# Patient Record
Sex: MALE | Race: WHITE | URBAN - METROPOLITAN AREA
[De-identification: names, ages, dates, MRNs, and addresses within clinical notes are randomized per-mention and may not be internally consistent; named-entity substitution may affect disease eponyms.]

---

## 2017-01-30 ENCOUNTER — OFFICE VISIT (OUTPATIENT)
Dept: OPTOMETRY | Facility: CLINIC | Age: 47
End: 2017-01-30
Payer: COMMERCIAL

## 2017-01-30 DIAGNOSIS — H01.02A SQUAMOUS BLEPHARITIS OF UPPER AND LOWER EYELIDS OF BOTH EYES: ICD-10-CM

## 2017-01-30 DIAGNOSIS — B88.0 INFESTATION BY DEMODEX: Primary | ICD-10-CM

## 2017-01-30 DIAGNOSIS — H01.02B SQUAMOUS BLEPHARITIS OF UPPER AND LOWER EYELIDS OF BOTH EYES: ICD-10-CM

## 2017-01-30 DIAGNOSIS — H10.13 ALLERGIC CONJUNCTIVITIS, BILATERAL: ICD-10-CM

## 2017-01-30 PROCEDURE — 99212 OFFICE O/P EST SF 10 MIN: CPT | Performed by: OPTOMETRIST

## 2017-01-30 RX ORDER — NEOMYCIN POLYMYXIN B SULFATES AND DEXAMETHASONE 3.5; 10000; 1 MG/ML; [USP'U]/ML; MG/ML
1 SUSPENSION/ DROPS OPHTHALMIC 4 TIMES DAILY
Qty: 1 BOTTLE | Refills: 0 | Status: SHIPPED | OUTPATIENT
Start: 2017-01-30 | End: 2017-03-14

## 2017-01-30 RX ORDER — OLOPATADINE HYDROCHLORIDE 1 MG/ML
1 SOLUTION/ DROPS OPHTHALMIC 2 TIMES DAILY
Qty: 5 ML | Refills: 6 | Status: SHIPPED | OUTPATIENT
Start: 2017-01-30 | End: 2017-03-14

## 2017-01-30 ASSESSMENT — VISUAL ACUITY
OS_SC: 20/20
OD_SC+: -2
METHOD: SNELLEN - LINEAR
OD_SC: 20/20

## 2017-01-30 ASSESSMENT — SLIT LAMP EXAM - LIDS
COMMENTS: COLLARETTES, 1+ BLEPHARITIS
COMMENTS: COLLARETTES, 1+ BLEPHARITIS

## 2017-01-30 ASSESSMENT — TONOMETRY
IOP_METHOD: TONOPEN
OD_IOP_MMHG: 14
OS_IOP_MMHG: 14

## 2017-01-30 ASSESSMENT — EXTERNAL EXAM - RIGHT EYE: OD_EXAM: ROSACEA

## 2017-01-30 ASSESSMENT — EXTERNAL EXAM - LEFT EYE: OS_EXAM: ROSACEA

## 2017-01-30 NOTE — MR AVS SNAPSHOT
After Visit Summary   1/30/2017    Denis Starks    MRN: [id number]           Patient Information     Date Of Birth          1970        Visit Information        Provider Department      1/30/2017 2:20 PM Logan Manning, OD [facility]        Today's Diagnoses     Infestation by Demodex    -  1     Allergic conjunctivitis, bilateral         Squamous blepharitis of upper and lower eyelids of both eyes           Care Instructions    Maxitrol drops- 1 drop both eyes 4 x day for 1 week then 3 x day for 1 week, 2 x day for 1 week then switch to Systane Ultra or a lubricating eye drop so you are always using a drop 4 x day.    Tea tree oil lid scrub 2 x day for 8 weeks.    Return in 6-8 weeks for recheck or sooner if needed.  Be sure to keep doing lid scrubs and lubricating eye drops until seen.     Patanol- 1 drop both eyes 2 x day as needed for itchy eyes.    Logan Manning, CHECO    How to Use Tea Tree Oil for Eyelid Cleaning: always dilute it at least 50% with water or olive oil or coconut oil       1. With clean hands, put 1 drops of Tea Tree Oil in a cup and 2-3 drops of water (or olive oil or coconut oil)  2. CLOSE YOUR EYES  3. Use a cotton ball or washcloth to gently cleanse your closed eyelashes.  4. Leave on for about 1 minute.   5. Rinse with warm water.          Follow-ups after your visit        Who to contact     If you have questions or need follow up information about today's clinic visit or your schedule please contact [facility] directly at [phone].  Normal or non-critical lab and imaging results will be communicated to you by AIRSIShart, letter or phone within 4 business days after the clinic has received the results. If you do not hear from us within 7 days, please contact the clinic through AIRSIShart or phone. If you have a critical or abnormal lab result, we will notify you by phone as soon as possible.  Submit refill requests through [employer] or  "call your pharmacy and they will forward the refill request to us. Please allow 3 business days for your refill to be completed.          Additional Information About Your Visit        MyChart Information     [employer] lets you send messages to your doctor, view your test results, renew your prescriptions, schedule appointments and more. To sign up, go to [email or web address] . Click on \"Log in\" on the left side of the screen, which will take you to the Welcome page. Then click on \"Sign up Now\" on the right side of the page.     You will be asked to enter the access code listed below, as well as some personal information. Please follow the directions to create your username and password.     Your access code is: [email or web address]  Expires: 3/2/2017  3:09 PM     Your access code will  in 90 days. If you need help or a new code, please call your [place] clinic or [phone].        Care EveryWhere ID     This is your Care EveryWhere ID. This could be used by other organizations to access your [place] medical records  [id number]         Blood Pressure from Last 3 Encounters:   16 132/84   10/25/13 122/88   13 130/68    Weight from Last 3 Encounters:   16 104.962 kg (231 lb 6.4 oz)   10/25/13 102.377 kg (225 lb 11.2 oz)   13 107.639 kg (237 lb 4.8 oz)              Today, you had the following     No orders found for display         Today's Medication Changes          These changes are accurate as of: 17  3:01 PM.  If you have any questions, ask your nurse or doctor.               Start taking these medicines.        Dose/Directions    neomycin-polymixin-dexamethasone ophthalmic suspension   Commonly known as:  MAXITROL   Used for:  Infestation by Demodex, Squamous blepharitis of upper and lower eyelids of both eyes   Started by:  Logan Manning, OD        Dose:  1 drop   Place 1 drop into both eyes 4 times daily   Quantity:  1 Bottle   Refills:  0            Where to get your " medicines      These medications were sent to [facility] [id number] [facility] - [place], [place] - [address]  [address] [facility], [place] [address]     Phone:  [phone]    - neomycin-polymixin-dexamethasone ophthalmic suspension  - olopatadine 0.1 % ophthalmic solution             Primary Care Provider Office Phone # Fax #    AL Bolaños [phone]       [facility] [address] [facility]  [place] [address]        Thank you!     Thank you for choosing [facility]  for your care. Our goal is always to provide you with excellent care. Hearing back from our patients is one way we can continue to improve our services. Please take a few minutes to complete the written survey that you may receive in the mail after your visit with us. Thank you!             Your Updated Medication List - Protect others around you: Learn how to safely use, store and throw away your medicines at www.disposemymeds.org.          This list is accurate as of: 1/30/17  3:01 PM.  Always use your most recent med list.                   Brand Name Dispense Instructions for use    DAILY MULTIVITAMIN PO      Daily       fexofenadine 180 MG tablet    ALLEGRA    90 Tab    1 TABLET DAILY       * METHYLIN 10 MG tablet   Generic drug:  methylphenidate      Take by mouth as needed.       * METHYLIN ER 20 MG CR tablet   Generic drug:  methylphenidate      1 Tablet twice daily       neomycin-polymixin-dexamethasone ophthalmic suspension    MAXITROL    1 Bottle    Place 1 drop into both eyes 4 times daily       olopatadine 0.1 % ophthalmic solution    PATANOL    5 mL    Place 1 drop into both eyes 2 times daily       * Notice:  This list has 2 medication(s) that are the same as other medications prescribed for you. Read the directions carefully, and ask your doctor or other care provider to review them with you.

## 2017-01-30 NOTE — PROGRESS NOTES
Chief Complaint   Patient presents with     Eye Problem     follow up from 12/6/16       HPI    Last Eye Exam:  12/6/16   Affected eye(s):  Both   Symptoms:     (Comment: 12/6/16)   Itching      Duration:  6 weeks         Comments:  Catrachito's symptoms cleared up and he discontinued using the drops. The symptoms returned. He needs more patenol drops. He is also using Tea Tree Therapy two times a day and started using Oust Demodes cleanser for the first time this morning. He is currently only experiencing some slight itchiness in the eyes.              Medical, surgical and family histories reviewed and updated 1/30/2017.       OBJECTIVE: See Ophthalmology exam    ASSESSMENT:    ICD-10-CM    1. Infestation by Demodex B88.0 neomycin-polymixin-dexamethasone (MAXITROL) ophthalmic suspension   2. Allergic conjunctivitis, bilateral H10.13 olopatadine (PATANOL) 0.1 % ophthalmic solution   3. Squamous blepharitis of upper and lower eyelids of both eyes H01.021 neomycin-polymixin-dexamethasone (MAXITROL) ophthalmic suspension    H01.022     H01.024     H01.025       PLAN:     Patient Instructions   Maxitrol drops- 1 drop both eyes 4 x day for 1 week then 3 x day for 1 week, 2 x day for 1 week then switch to Systane Ultra or a lubricating eye drop so you are always using a drop 4 x day.    Tea tree oil lid scrub 2 x day for 8 weeks.    Return in 6-8 weeks for recheck or sooner if needed.    Logan Manning, OD

## 2017-01-30 NOTE — PATIENT INSTRUCTIONS
Maxitrol drops- 1 drop both eyes 4 x day for 1 week then 3 x day for 1 week, 2 x day for 1 week then switch to Systane Ultra or a lubricating eye drop so you are always using a drop 4 x day.    Tea tree oil lid scrub 2 x day for 8 weeks.    Return in 6-8 weeks for recheck or sooner if needed.  Be sure to keep doing lid scrubs and lubricating eye drops until seen.     Patanol- 1 drop both eyes 2 x day as needed for itchy eyes.    Logan Manning, OD    How to Use Tea Tree Oil for Eyelid Cleaning: always dilute it at least 50% with water or olive oil or coconut oil       1. With clean hands, put 1 drops of Tea Tree Oil in a cup and 2-3 drops of water (or olive oil or coconut oil)  2. CLOSE YOUR EYES  3. Use a cotton ball or washcloth to gently cleanse your closed eyelashes.  4. Leave on for about 1 minute.   5. Rinse with warm water.

## 2017-03-13 ENCOUNTER — TELEPHONE (OUTPATIENT)
Dept: OPTOMETRY | Facility: CLINIC | Age: 47
End: 2017-03-13

## 2017-03-13 DIAGNOSIS — H10.13 ALLERGIC CONJUNCTIVITIS, BILATERAL: ICD-10-CM

## 2017-03-13 DIAGNOSIS — H01.02A SQUAMOUS BLEPHARITIS OF UPPER AND LOWER EYELIDS OF BOTH EYES: ICD-10-CM

## 2017-03-13 DIAGNOSIS — H01.02B SQUAMOUS BLEPHARITIS OF UPPER AND LOWER EYELIDS OF BOTH EYES: ICD-10-CM

## 2017-03-13 DIAGNOSIS — B88.0 INFESTATION BY DEMODEX: ICD-10-CM

## 2017-03-13 NOTE — TELEPHONE ENCOUNTER
Reason for call: Medication   If this is a refill request, has the caller requested the refill from the pharmacy already? No  Will the patient be using a [facility]? No  Name of the pharmacy and phone number for the current request: [facility] [address] [place] [facility], [place] - [address] [facility]    Name of the medication requested: Patanol, Maxitrol    Other request: Needs refill    Phone Number Pt can be reached at: Home number on file [phone] (home)  Best Time: anytime  Can we leave a detailed message on this number? YES

## 2017-03-14 RX ORDER — NEOMYCIN POLYMYXIN B SULFATES AND DEXAMETHASONE 3.5; 10000; 1 MG/ML; [USP'U]/ML; MG/ML
1 SUSPENSION/ DROPS OPHTHALMIC 4 TIMES DAILY
Qty: 1 BOTTLE | Refills: 0 | Status: SHIPPED | OUTPATIENT
Start: 2017-03-14

## 2017-03-14 RX ORDER — OLOPATADINE HYDROCHLORIDE 1 MG/ML
1 SOLUTION/ DROPS OPHTHALMIC 2 TIMES DAILY
Qty: 5 ML | Refills: 6 | Status: SHIPPED | OUTPATIENT
Start: 2017-03-14 | End: 2018-03-09

## 2017-03-15 NOTE — PROGRESS NOTES
SUBJECTIVE:                                                    Denis Starks is a 46 year old male who presents to clinic today for the following health issues:      Rash     Onset: 1 month ago, getting worse    Description:   Location: left forearm and back neck   Character: raised, red  Itching (Pruritis): YES    Progression of Symptoms:  worsening    Accompanying Signs & Symptoms:  Fever: no   Body aches or joint pain: no   Sore throat symptoms: no   Recent cold symptoms: no    History:   Previous similar rash: no     Precipitating factors:   Exposure to similar rash: no   New exposures: None   Recent travel: no     Alleviating factors:  none     Therapies Tried and outcome: antibiotic/itching cream OTC- no help,      Diagnosed with mite Demodex infestation around eyes. Has flares from time to time. Told no curative treatments for this. Having itchiness on back of neck- similar to eyes.   Also rash has developed for the past several weeks on forearms left > right. 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 Denies carrying heavy boxes at a high frequency.     Mood is stable- managed by outside psychiatry.     Due for PE/fasting lipids.       Problem list and histories reviewed & adjusted, as indicated.  Additional history: as documented    Patient Active Problem List   Diagnosis     Sleep apnea     Seasonal allergic rhinitis     CARDIOVASCULAR SCREENING; LDL GOAL LESS THAN 130     Elevated blood pressure     Infestation by Demodex     Squamous blepharitis of upper and lower eyelids of both eyes     Allergic conjunctivitis, bilateral     Past Surgical History:   Procedure Laterality Date      EXCISION OF UVULA  1994      TOOTH EXTRACTION W/FORCEP       SURGICAL HISTORY OF -       rectal     TONSILLECTOMY         Social History   Substance Use Topics     Smoking status: Passive Smoke Exposure - Never Smoker     Smokeless tobacco: Never Used      Comment: socially     Alcohol  "use Yes      Comment: occ     Family History   Problem Relation Age of Onset     DIABETES Mother      ? prediabetes     HEART DISEASE Father      Hypertension No family hx of      C.A.D. No family hx of      CANCER No family hx of            Reviewed and updated as needed this visit by clinical staff       Reviewed and updated as needed this visit by Provider         ROS:  Constitutional, HEENT, cardiovascular, pulmonary, gi and gu systems are negative, except as otherwise noted.    OBJECTIVE:                                                    /88 (BP Location: Left arm, Cuff Size: Adult Large)  Pulse 68  Temp 98.2  F (36.8  C) (Temporal)  Resp 16  Ht 5' 10\" (1.778 m)  Wt 239 lb (108.4 kg)  BMI 34.29 kg/m2  Body mass index is 34.29 kg/(m^2).  GENERAL APPEARANCE: healthy, alert and no distress  EYES: Eyes grossly normal to inspection and mild dry flakiness to bilat upper and lower lids- no redness noted today  NECK: no adenopathy, no asymmetry, masses, or scars and thyroid normal to palpation  RESP: lungs clear to auscultation - no rales, rhonchi or wheezes  SKIN: nape of neck with macular red/pink seborrheic or follicularis eruption along the neck on both sides, left is more inflamed and irrigated with excoriation/mild eschar formation. No drainage.   Forearms with papular red, excoriated appearance, sparse on right forearm, left with more generalized on ant forearm- posterior surfaces and hands completely spared.   NEURO: Normal strength and tone, mentation intact and speech normal  PSYCH: mentation appears normal and affect normal/bright         ASSESSMENT/PLAN:                                                        ICD-10-CM    1. Irritant contact dermatitis, unspecified trigger L24.9 triamcinolone (KENALOG) 0.1 % cream     DERMATOLOGY REFERRAL   2. Folliculitis L73.9 DERMATOLOGY REFERRAL   3. Infestation by Demodex B88.0 ivermectin (STROMECTOL) 3 MG TABS tablet     metroNIDAZOLE (FLAGYL) 500 MG " tablet     DERMATOLOGY REFERRAL       Suspect seborrheic/folliculitis barbae vs. Demodex spread to neck region. Left forearm suspect contact dermatitis to forearm from holding boxes to unpack at work and irritation from sweat, dirt, and excoriation.   Discussed not itching- pt. Noted to be doing this several times in exam- have discussed in the past.   Derm referral.   For short-term-  Forearm coverage with sleeves duering work keeping clean/dry, can use mild cleanser to forearms and steroid cream.   For Demodex treatment, trial of  Ivermectin and Metrogel- hoping to calm eyes and neck, continue Patanol and oral allergy meds to help with flares.   Return to clinic with any new or worsening symptoms, and as needed.     [facility]

## 2017-03-20 ENCOUNTER — OFFICE VISIT (OUTPATIENT)
Dept: OPTOMETRY | Facility: CLINIC | Age: 47
End: 2017-03-20
Payer: COMMERCIAL

## 2017-03-20 DIAGNOSIS — H01.02A SQUAMOUS BLEPHARITIS OF UPPER AND LOWER EYELIDS OF BOTH EYES: Primary | ICD-10-CM

## 2017-03-20 DIAGNOSIS — H10.13 ALLERGIC CONJUNCTIVITIS, BILATERAL: ICD-10-CM

## 2017-03-20 DIAGNOSIS — H01.02B SQUAMOUS BLEPHARITIS OF UPPER AND LOWER EYELIDS OF BOTH EYES: Primary | ICD-10-CM

## 2017-03-20 PROCEDURE — 99213 OFFICE O/P EST LOW 20 MIN: CPT | Performed by: OPTOMETRIST

## 2017-03-20 ASSESSMENT — EXTERNAL EXAM - RIGHT EYE: OD_EXAM: ROSACEA

## 2017-03-20 ASSESSMENT — VISUAL ACUITY
OS_SC: 20/25
METHOD: SNELLEN - LINEAR
OS_SC+: -2
OD_SC+: -2
OD_SC: 20/25

## 2017-03-20 ASSESSMENT — TONOMETRY
OS_IOP_MMHG: 20
OD_IOP_MMHG: 20
IOP_METHOD: APPLANATION

## 2017-03-20 ASSESSMENT — EXTERNAL EXAM - LEFT EYE: OS_EXAM: ROSACEA

## 2017-03-20 ASSESSMENT — SLIT LAMP EXAM - LIDS
COMMENTS: COLLARETTES, 1+ BLEPHARITIS
COMMENTS: COLLARETTES, 1+ BLEPHARITIS

## 2017-03-20 NOTE — PATIENT INSTRUCTIONS
Use Patanol- 1 drop both eyes 2 x day for 2-3 weeks then as needed for itchy eyes.  Maxitrol- ok to use for a few days if itching flares up.    Take allergy pill daily.    Continue Oust to clean eyelids.  Artificial tear- 2 x-4 x day.    Consider Doxycycline if flare ups continue.    Return as needed.     Recommend annual eye exams.    Logan aMnning, OD

## 2017-03-20 NOTE — PROGRESS NOTES
Red Eye Recheck    Reason:   Chief Complaint   Patient presents with     RECHECK     February was good- then March eyes and eyelids got really itchy and swollen-    Eyes feel: worse    Medications used: Artificial Tears today- Patanol and Maxitrol as needed.  Using Oust Demodex to clean eyelids.       OBJECTIVE:     See ophthalmology exam      ASSESSMENT:        ICD-10-CM    1. Squamous blepharitis of upper and lower eyelids of both eyes H01.021     H01.022     H01.024     H01.025    2. Allergic conjunctivitis, bilateral H10.13           PLAN:       Patient Instructions   Use Patanol- 1 drop both eyes 2 x day for 2-3 weeks then as needed for itchy eyes.  Maxitrol- ok to use for a few days if itching flares up.    Take allergy pill daily.    Continue Oust to clean eyelids.  Artificial tear- 2 x-4 x day.    Consider Doxycycline if flare ups continue.    Return as needed.     Recommend annual eye exams.    Logan Manning, OD

## 2017-03-20 NOTE — MR AVS SNAPSHOT
After Visit Summary   3/20/2017    Denis Starks    MRN: [id number]           Patient Information     Date Of Birth          1970        Visit Information        Provider Department      3/20/2017 2:20 PM Logan Manning, CHECO [facility]        Today's Diagnoses     Squamous blepharitis of upper and lower eyelids of both eyes    -  1    Allergic conjunctivitis, bilateral          Care Instructions    Use Patanol- 1 drop both eyes 2 x day for 2-3 weeks then as needed for itchy eyes.  Maxitrol- ok to use for a few days if itching flares up.    Take allergy pill daily.    Continue Oust to clean eyelids.  Artificial tear- 2 x-4 x day.    Consider Doxycycline if flare ups continue.    Return as needed.     Recommend annual eye exams.    Logan Manning OD          Follow-ups after your visit        Follow-up notes from your care team     Return if symptoms worsen or fail to improve.      Your next 10 appointments already scheduled     Mar 21, 2017  2:40 PM CDT   SHORT with AL Bolaños CNP   [facility] ([facility]    [address] [place] [address], [address]  [facility] [id number] [phone]              Who to contact     If you have questions or need follow up information about today's clinic visit or your schedule please contact [facility] directly at [phone].  Normal or non-critical lab and imaging results will be communicated to you by MyChart, letter or phone within 4 business days after the clinic has received the results. If you do not hear from us within 7 days, please contact the clinic through MyChart or phone. If you have a critical or abnormal lab result, we will notify you by phone as soon as possible.  Submit refill requests through [employer] or call your pharmacy and they will forward the refill request to us. Please allow 3 business days for your refill to be completed.          Additional Information About Your Visit       "  MyChart Information     [employer] lets you send messages to your doctor, view your test results, renew your prescriptions, schedule appointments and more. To sign up, go to [email or web address] . Click on \"Log in\" on the left side of the screen, which will take you to the Welcome page. Then click on \"Sign up Now\" on the right side of the page.     You will be asked to enter the access code listed below, as well as some personal information. Please follow the directions to create your username and password.     Your access code is: [email or web address]  Expires: 2017  2:50 PM     Your access code will  in 90 days. If you need help or a new code, please call your [place] clinic or [phone].        Care EveryWhere ID     This is your Care EveryWhere ID. This could be used by other organizations to access your [place] medical records  [id number]         Blood Pressure from Last 3 Encounters:   16 132/84   10/25/13 122/88   13 130/68    Weight from Last 3 Encounters:   16 105 kg (231 lb 6.4 oz)   10/25/13 102.4 kg (225 lb 11.2 oz)   13 107.6 kg (237 lb 4.8 oz)              Today, you had the following     No orders found for display       Primary Care Provider Office Phone # Fax #    AL Bolaños [facility] [phone]       [facility] [address] [facility] [address]        Thank you!     Thank you for choosing [facility]  for your care. Our goal is always to provide you with excellent care. Hearing back from our patients is one way we can continue to improve our services. Please take a few minutes to complete the written survey that you may receive in the mail after your visit with us. Thank you!             Your Updated Medication List - Protect others around you: Learn how to safely use, store and throw away your medicines at www.disposemymeds.org.          This list is accurate as of: 3/20/17  2:50 PM.  Always use your most " recent med list.                   Brand Name Dispense Instructions for use    DAILY MULTIVITAMIN PO      Daily       fexofenadine 180 MG tablet    ALLEGRA    90 Tab    1 TABLET DAILY       * METHYLIN 10 MG tablet   Generic drug:  methylphenidate      Take by mouth as needed.       * METHYLIN ER 20 MG CR tablet   Generic drug:  methylphenidate      1 Tablet twice daily       neomycin-polymixin-dexamethasone ophthalmic suspension    MAXITROL    1 Bottle    Place 1 drop into both eyes 4 times daily       olopatadine 0.1 % ophthalmic solution    PATANOL    5 mL    Place 1 drop into both eyes 2 times daily       * Notice:  This list has 2 medication(s) that are the same as other medications prescribed for you. Read the directions carefully, and ask your doctor or other care provider to review them with you.

## 2017-03-21 ENCOUNTER — OFFICE VISIT (OUTPATIENT)
Dept: FAMILY MEDICINE | Facility: CLINIC | Age: 47
End: 2017-03-21
Payer: COMMERCIAL

## 2017-03-21 VITALS
DIASTOLIC BLOOD PRESSURE: 88 MMHG | WEIGHT: 239 LBS | SYSTOLIC BLOOD PRESSURE: 130 MMHG | HEIGHT: 70 IN | RESPIRATION RATE: 16 BRPM | TEMPERATURE: 98.2 F | BODY MASS INDEX: 34.22 KG/M2 | HEART RATE: 68 BPM

## 2017-03-21 DIAGNOSIS — B88.0 INFESTATION BY DEMODEX: ICD-10-CM

## 2017-03-21 DIAGNOSIS — L24.9 IRRITANT CONTACT DERMATITIS, UNSPECIFIED TRIGGER: Primary | ICD-10-CM

## 2017-03-21 DIAGNOSIS — L73.9 FOLLICULITIS: ICD-10-CM

## 2017-03-21 PROCEDURE — 99214 OFFICE O/P EST MOD 30 MIN: CPT | Performed by: NURSE PRACTITIONER

## 2017-03-21 RX ORDER — IVERMECTIN 3 MG/1
3 TABLET ORAL ONCE
Qty: 2 TABLET | Refills: 0 | Status: SHIPPED | OUTPATIENT
Start: 2017-03-21 | End: 2017-03-21

## 2017-03-21 RX ORDER — METRONIDAZOLE 500 MG/1
500 TABLET ORAL 2 TIMES DAILY
Qty: 14 TABLET | Refills: 0 | Status: SHIPPED | OUTPATIENT
Start: 2017-03-21 | End: 2017-04-20

## 2017-03-21 RX ORDER — TRIAMCINOLONE ACETONIDE 1 MG/G
CREAM TOPICAL
Qty: 45 G | Refills: 0 | Status: SHIPPED | OUTPATIENT
Start: 2017-03-21 | End: 2018-04-30

## 2017-03-21 RX ORDER — METHYLPHENIDATE HYDROCHLORIDE EXTENDED RELEASE 20 MG/1
1 TABLET ORAL
COMMUNITY
Start: 2016-10-23

## 2017-03-21 ASSESSMENT — PAIN SCALES - GENERAL: PAINLEVEL: NO PAIN (0)

## 2017-03-21 NOTE — MR AVS SNAPSHOT
After Visit Summary   3/21/2017    Denis Starks    MRN: [id number]           Patient Information     Date Of Birth          1970        Visit Information        Provider Department      3/21/2017 2:40 PM Nelida Pardo APRN CNP [facility]        Today's Diagnoses     Irritant contact dermatitis, unspecified trigger    -  1    Folliculitis        Infestation by Demodex           Follow-ups after your visit        Additional Services     DERMATOLOGY REFERRAL       Your provider has referred you to: [facility]: [facility] [phone]   [email or web address]    Please be aware that coverage of these services is subject to the terms and limitations of your health insurance plan.  Call member services at your health plan with any benefit or coverage questions.      Please bring the following with you to your appointment:    (1) Any X-Rays, CTs or MRIs which have been performed.  Contact the facility where they were done to arrange for  prior to your scheduled appointment.    (2) List of current medications  (3) This referral request   (4) Any documents/labs given to you for this referral                  Who to contact     If you have questions or need follow up information about today's clinic visit or your schedule please contact [facility] [place] directly at [phone].  Normal or non-critical lab and imaging results will be communicated to you by MyChart, letter or phone within 4 business days after the clinic has received the results. If you do not hear from us within 7 days, please contact the clinic through MyChart or phone. If you have a critical or abnormal lab result, we will notify you by phone as soon as possible.  Submit refill requests through [employer] or call your pharmacy and they will forward the refill request to us. Please allow 3 business days for your refill to be completed.     "      Additional Information About Your Visit        MyChart Information     [employer] lets you send messages to your doctor, view your test results, renew your prescriptions, schedule appointments and more. To sign up, go to [email or web address] . Click on \"Log in\" on the left side of the screen, which will take you to the Welcome page. Then click on \"Sign up Now\" on the right side of the page.     You will be asked to enter the access code listed below, as well as some personal information. Please follow the directions to create your username and password.     Your access code is: [email or web address]  Expires: 2017  2:50 PM     Your access code will  in 90 days. If you need help or a new code, please call your [place] clinic or [phone].        Care EveryWhere ID     This is your Care EveryWhere ID. This could be used by other organizations to access your [place] medical records  [id number]        Your Vitals Were     Pulse Temperature Respirations Height BMI (Body Mass Index)       68 98.2  F (36.8  C) (Temporal) 16 5' 10\" (1.778 m) 34.29 kg/m2        Blood Pressure from Last 3 Encounters:   17 130/88   16 132/84   10/25/13 122/88    Weight from Last 3 Encounters:   17 239 lb (108.4 kg)   16 231 lb 6.4 oz (105 kg)   10/25/13 225 lb 11.2 oz (102.4 kg)              We Performed the Following     DERMATOLOGY REFERRAL          Today's Medication Changes          These changes are accurate as of: 3/21/17  3:04 PM.  If you have any questions, ask your nurse or doctor.               Start taking these medicines.        Dose/Directions    ivermectin 3 MG Tabs tablet   Commonly known as:  STROMECTOL   Used for:  Infestation by Demodex   Started by:  Nelida Pardo APRN CNP        Dose:  3 mg   Take 1 tablet (3 mg) by mouth once for 1 dose Repeat in 7 days   Quantity:  2 tablet   Refills:  0       metroNIDAZOLE 500 MG tablet   Commonly known as:  FLAGYL   Used for:  Infestation by " Demodex   Started by:  Nelida Pardo APRN CNP        Dose:  500 mg   Take 1 tablet (500 mg) by mouth 2 times daily   Quantity:  14 tablet   Refills:  0       triamcinolone 0.1 % cream   Commonly known as:  KENALOG   Used for:  Irritant contact dermatitis, unspecified trigger   Started by:  Nelida Pardo APRN CNP        Apply sparingly to affected area three times daily for 14 days.   Quantity:  45 g   Refills:  0            Where to get your medicines      These medications were sent to [facility] [address] [facility] - [place], [place] - [address]  [address] [address] [facility] [place] [address]     Phone:  [phone]     ivermectin 3 MG Tabs tablet    metroNIDAZOLE 500 MG tablet    triamcinolone 0.1 % cream                Primary Care Provider Office Phone # Fax #    AL Bolaños [phone]       [facility] [address] [facility] [address]        Thank you!     Thank you for choosing [facility]  for your care. Our goal is always to provide you with excellent care. Hearing back from our patients is one way we can continue to improve our services. Please take a few minutes to complete the written survey that you may receive in the mail after your visit with us. Thank you!             Your Updated Medication List - Protect others around you: Learn how to safely use, store and throw away your medicines at www.disposemymeds.org.          This list is accurate as of: 3/21/17  3:04 PM.  Always use your most recent med list.                   Brand Name Dispense Instructions for use    DAILY MULTIVITAMIN PO      Reported on 3/21/2017       fexofenadine 180 MG tablet    ALLEGRA    90 Tab    1 TABLET DAILY       ivermectin 3 MG Tabs tablet    STROMECTOL    2 tablet    Take 1 tablet (3 mg) by mouth once for 1 dose Repeat in 7 days       * METHYLIN 10 MG tablet   Generic drug:  methylphenidate      Take by mouth as needed Reported on 3/21/2017       * methylphenidate 20 MG CR  tablet    METADATE ER     Take 1 tablet by mouth       metroNIDAZOLE 500 MG tablet    FLAGYL    14 tablet    Take 1 tablet (500 mg) by mouth 2 times daily       neomycin-polymixin-dexamethasone ophthalmic suspension    MAXITROL    1 Bottle    Place 1 drop into both eyes 4 times daily       olopatadine 0.1 % ophthalmic solution    PATANOL    5 mL    Place 1 drop into both eyes 2 times daily       triamcinolone 0.1 % cream    KENALOG    45 g    Apply sparingly to affected area three times daily for 14 days.       * Notice:  This list has 2 medication(s) that are the same as other medications prescribed for you. Read the directions carefully, and ask your doctor or other care provider to review them with you.

## 2017-03-21 NOTE — NURSING NOTE
"Chief Complaint   Patient presents with     Derm Problem     Panel Management     MyChart, Lipids       Initial /88 (BP Location: Left arm, Cuff Size: Adult Large)  Pulse 68  Temp 98.2  F (36.8  C) (Temporal)  Resp 16  Ht 5' 10\" (1.778 m)  Wt 239 lb (108.4 kg)  BMI 34.29 kg/m2 Estimated body mass index is 34.29 kg/(m^2) as calculated from the following:    Height as of this encounter: 5' 10\" (1.778 m).    Weight as of this encounter: 239 lb (108.4 kg).  Medication Reconciliation: complete     Alli Hernandez MA    "

## 2017-04-19 NOTE — PROGRESS NOTES
SUBJECTIVE:                                                    Denis Starks is a 46 year old male who presents to clinic today for the following health issues:    Concern - Check Skin Tag     Onset: x 3 months    Description:   Skin tags on both eyelids     Progression of Symptoms:  same    Accompanying Signs & Symptoms:  none       Previous history of similar problem:   no    Precipitating factors:   Worsened by: n/a    Alleviating factors:  Improved by: n/a       Therapies Tried and outcome: none      Patient reports he has a total of 4 skin tags that he would like removed. He has two skin tags on left upper eyelid, one skin tag on right upper eyelid, and one skin tag on right lower eyelid. He denies having any skin tags on any other part of his body.     Patient relates the itchiness, swelling and redness of his eyes have improved after ivermectin and metrogel treatment. He did the two week daily dosing. He continues to use eye drops. Patient denies using moisturizer on his eyelids. But, he is starting a anti-bacterial/steroid ointment in the last day to the eyelids. The dermatologist did not think his eyelids had a demodex infestation per his report.     Problem list and histories reviewed & adjusted, as indicated.  Additional history: as documented    Patient Active Problem List   Diagnosis     Sleep apnea     Seasonal allergic rhinitis     CARDIOVASCULAR SCREENING; LDL GOAL LESS THAN 130     Elevated blood pressure     Infestation by Demodex     Squamous blepharitis of upper and lower eyelids of both eyes     Allergic conjunctivitis, bilateral     Past Surgical History:   Procedure Laterality Date      EXCISION OF UVULA  1994      TOOTH EXTRACTION W/FORCEP       SURGICAL HISTORY OF -       rectal     TONSILLECTOMY         Social History   Substance Use Topics     Smoking status: Passive Smoke Exposure - Never Smoker     Smokeless tobacco: Never Used      Comment: socially     Alcohol use Yes       "Comment: occ     Family History   Problem Relation Age of Onset     DIABETES Mother      ? prediabetes     HEART DISEASE Father      Hypertension No family hx of      C.A.D. No family hx of      CANCER No family hx of          Current Outpatient Prescriptions   Medication Sig Dispense Refill     methylphenidate (METADATE ER) 20 MG CR tablet Take 1 tablet by mouth       triamcinolone (KENALOG) 0.1 % cream Apply sparingly to affected area three times daily for 14 days. 45 g 0     olopatadine (PATANOL) 0.1 % ophthalmic solution Place 1 drop into both eyes 2 times daily 5 mL 6     neomycin-polymixin-dexamethasone (MAXITROL) ophthalmic suspension Place 1 drop into both eyes 4 times daily 1 Bottle 0     FEXOFENADINE  MG PO TABS 1 TABLET DAILY 90 Tab 3     METHYLIN 10 MG OR TABS Take by mouth as needed Reported on 3/21/2017         Reviewed and updated as needed this visit by clinical staff  Tobacco  Allergies  Med Hx  Surg Hx  Fam Hx  Soc Hx      Reviewed and updated as needed this visit by Provider         ROS:  Constitutional, HEENT, cardiovascular, pulmonary, gi and gu systems are negative, except as otherwise noted.    This document serves as a record of the services and decisions personally performed and made by Nelida Pardo DNP. It was created on her behalf by Rosario Bob, a trained medical scribe. The creation of this document is based on the provider's statements to the medical scribe.  Rosario Bob 3:21 PM April 20, 2017    OBJECTIVE:                                                    /84 (BP Location: Left arm, Patient Position: Chair, Cuff Size: Adult Large)  Pulse 64  Temp 98.8  F (37.1  C) (Temporal)  Resp 16  Ht 5' 9.8\" (1.773 m)  Wt 239 lb 3.2 oz (108.5 kg)  BMI 34.52 kg/m2  Body mass index is 34.52 kg/(m^2).  GENERAL APPEARANCE: healthy, alert and no distress  SKIN: Right eye: two skin lesion on right upper eyelid more lateral on the crease, largest is 2 mm protruding " with some papilloma like surface, 1 mm lesion just medial to that. Left eye: intercathus on lower lid 2-3 mm more flattened lesion that has papilloma like surface, a small 1 mm skin tag on upper lid laterally just distal to eye lashed. no other suspicious lesions or rashes  NEURO: Normal strength and tone, mentation intact and speech normal  PSYCH: mentation appears normal and affect normal/bright       ASSESSMENT/PLAN:                                                        ICD-10-CM    1. Skin tag L91.8 DESTRUCT BENIGN LESION, UP TO 14   2. Skin lesion L98.9 DESTRUCT BENIGN LESION, UP TO 14       Discussed presentation, some lesions have papilloma-like appearance, differentials include skin tag, vs. Common wart vs. Other.   Each skin tag/lesion was frozen easily with liquid nitrogen application. A total of 4 skinlesions were treated today. The etiology of common warts was discussed. Discussed procedure and potential outcomes, including irritation to under eyelid.    Wound cares, blister formation, and resolution discussed. The patient is to return every three weeks until all skin tags have been removed. Return to clinic with any new or worsening symptoms, and prn.      BP in normal range today, continue to monitor. Pt. Reminded he is due for PE.     Recommended patient wear sunscreen on skin treated areas or cover from the sun.     Recommended patient apply a hydrocortisone to eyelids and continue with eyedrops.     Follow up with Provider - As needed     The information in this document, created by the medical scribe for me, accurately reflects the services I personally performed and the decisions made by me. I have reviewed and approved this document for accuracy prior to leaving the patient care area.  April 20, 2017 3:15 PM    AL Stafford [facility]

## 2017-04-20 ENCOUNTER — OFFICE VISIT (OUTPATIENT)
Dept: FAMILY MEDICINE | Facility: CLINIC | Age: 47
End: 2017-04-20
Payer: COMMERCIAL

## 2017-04-20 VITALS
HEART RATE: 64 BPM | TEMPERATURE: 98.8 F | WEIGHT: 239.2 LBS | SYSTOLIC BLOOD PRESSURE: 128 MMHG | DIASTOLIC BLOOD PRESSURE: 84 MMHG | HEIGHT: 70 IN | BODY MASS INDEX: 34.24 KG/M2 | RESPIRATION RATE: 16 BRPM

## 2017-04-20 DIAGNOSIS — L98.9 SKIN LESION: ICD-10-CM

## 2017-04-20 DIAGNOSIS — L91.8 SKIN TAG: Primary | ICD-10-CM

## 2017-04-20 PROCEDURE — 99207 ZZC NO CHARGE LOS: CPT | Performed by: NURSE PRACTITIONER

## 2017-04-20 PROCEDURE — 17110 DESTRUCTION B9 LES UP TO 14: CPT | Performed by: NURSE PRACTITIONER

## 2017-04-20 ASSESSMENT — PAIN SCALES - GENERAL: PAINLEVEL: NO PAIN (0)

## 2017-04-20 NOTE — MR AVS SNAPSHOT
"              After Visit Summary   4/20/2017    Denis Starks    MRN: [id number]           Patient Information     Date Of Birth          1970        Visit Information        Provider Department      4/20/2017 3:00 PM Nelida Pardo APRN CNP [facility]        Today's Diagnoses     Skin tag    -  1    Skin lesion           Follow-ups after your visit        Your next 10 appointments already scheduled     Jun 06, 2017  2:30 PM CDT   [facility]    [address] [place] [id number] [phone]              Who to contact     If you have questions or need follow up information about today's clinic visit or your schedule please contact [facility] [place] directly at [phone].  Normal or non-critical lab and imaging results will be communicated to you by MyChart, letter or phone within 4 business days after the clinic has received the results. If you do not hear from us within 7 days, please contact the clinic through MyChart or phone. If you have a critical or abnormal lab result, we will notify you by phone as soon as possible.  Submit refill requests through [employer] or call your pharmacy and they will forward the refill request to us. Please allow 3 business days for your refill to be completed.          Additional Information About Your Visit        MyChart Information     [employer] lets you send messages to your doctor, view your test results, renew your prescriptions, schedule appointments and more. To sign up, go to [email or web address] . Click on \"Log in\" on the left side of the screen, which will take you to the Welcome page. Then click on \"Sign up Now\" on the right side of the page.     You will be asked to enter the access code listed below, as well as some personal information. Please follow the directions to create your username and password.     Your access code is: " [email or web address]  Expires: 2017  2:50 PM     Your access code will  in 90 days. If you need help or a new code, please call your [place] clinic or [phone].        Care EveryWhere ID     This is your Care EveryWhere ID. This could be used by other organizations to access your [place] medical records  [id number]        Your Vitals Were     Pulse Temperature Respirations Height BMI (Body Mass Index)       64 98.8  F (37.1  C) (Temporal) 16 5' 9.8\" (1.773 m) 34.52 kg/m2        Blood Pressure from Last 3 Encounters:   17 128/84   17 130/88   16 132/84    Weight from Last 3 Encounters:   17 239 lb 3.2 oz (108.5 kg)   17 239 lb (108.4 kg)   16 231 lb 6.4 oz (105 kg)              We Performed the Following     DESTRUCT BENIGN LESION, UP TO 14        Primary Care Provider Office Phone # Fax #    AL Bolaños [facility] [phone]       [facility] [address] [facility] [address]        Thank you!     Thank you for choosing [facility]  for your care. Our goal is always to provide you with excellent care. Hearing back from our patients is one way we can continue to improve our services. Please take a few minutes to complete the written survey that you may receive in the mail after your visit with us. Thank you!             Your Updated Medication List - Protect others around you: Learn how to safely use, store and throw away your medicines at www.disposemymeds.org.          This list is accurate as of: 17 11:59 PM.  Always use your most recent med list.                   Brand Name Dispense Instructions for use    fexofenadine 180 MG tablet    ALLEGRA    90 Tab    1 TABLET DAILY       * METHYLIN 10 MG tablet   Generic drug:  methylphenidate      Take by mouth as needed Reported on 3/21/2017       * methylphenidate 20 MG CR tablet    METADATE ER     Take 1 tablet by mouth       neomycin-polymixin-dexamethasone ophthalmic " suspension    MAXITROL    1 Bottle    Place 1 drop into both eyes 4 times daily       olopatadine 0.1 % ophthalmic solution    PATANOL    5 mL    Place 1 drop into both eyes 2 times daily       triamcinolone 0.1 % cream    KENALOG    45 g    Apply sparingly to affected area three times daily for 14 days.       * Notice:  This list has 2 medication(s) that are the same as other medications prescribed for you. Read the directions carefully, and ask your doctor or other care provider to review them with you.

## 2017-04-20 NOTE — NURSING NOTE
"Chief Complaint   Patient presents with     Skin Tags     check skin tags on both eyelids     Panel Management     MyChart, Lipids       Initial /84 (BP Location: Left arm, Patient Position: Chair, Cuff Size: Adult Large)  Pulse 64  Temp 98.8  F (37.1  C) (Temporal)  Resp 16  Ht 5' 9.8\" (1.773 m)  Wt 239 lb 3.2 oz (108.5 kg)  BMI 34.52 kg/m2 Estimated body mass index is 34.52 kg/(m^2) as calculated from the following:    Height as of this encounter: 5' 9.8\" (1.773 m).    Weight as of this encounter: 239 lb 3.2 oz (108.5 kg).  Medication Reconciliation: complete  Rachael Wade CMA (AAMA)    "

## 2017-10-03 ENCOUNTER — TRANSFERRED RECORDS (OUTPATIENT)
Dept: HEALTH INFORMATION MANAGEMENT | Facility: CLINIC | Age: 47
End: 2017-10-03

## 2018-01-27 ENCOUNTER — TRANSFERRED RECORDS (OUTPATIENT)
Dept: HEALTH INFORMATION MANAGEMENT | Facility: CLINIC | Age: 48
End: 2018-01-27

## 2018-03-07 NOTE — PROGRESS NOTES
SUBJECTIVE:   Denis Starks is a 47 year old male who presents to clinic today for the following health issues:      History of Present Illness     Hyperlipidemia:     Low fat/chol diet rating::  Not monitoring fat    Taking Statins::  No    Lipid Medications or Supplements::  None    Diet:  Regular (no restrictions)  Frequency of exercise:  None  Medication side effects:  None  Additional concerns today:  No    Patient believes that he is allergic to his pets at home. He has a cat and dog at home and has been around them his whole life. Symptoms include watery and itchy eyes, right eye > left. Started 3 weeks ago. He denies other symptoms including sneezing or runny nose. He has been taking Fexofenadine 180mg hoping it would help with his eye symptoms, but states it hasn't.    Problem list and histories reviewed & adjusted, as indicated.  Additional history: as documented  Patient Active Problem List   Diagnosis     Sleep apnea     Seasonal allergic rhinitis     CARDIOVASCULAR SCREENING; LDL GOAL LESS THAN 130     Elevated blood pressure     Infestation by Demodex     Squamous blepharitis of upper and lower eyelids of both eyes     Allergic conjunctivitis, bilateral     Past Surgical History:   Procedure Laterality Date      EXCISION OF UVULA  1994      TOOTH EXTRACTION W/FORCEP       SURGICAL HISTORY OF -       rectal     TONSILLECTOMY         Social History   Substance Use Topics     Smoking status: Passive Smoke Exposure - Never Smoker     Smokeless tobacco: Never Used      Comment: socially     Alcohol use Yes      Comment: occ     Family History   Problem Relation Age of Onset     DIABETES Mother      ? prediabetes     HEART DISEASE Father      Hypertension No family hx of      C.A.D. No family hx of      CANCER No family hx of          Current Outpatient Prescriptions   Medication Sig Dispense Refill     olopatadine (PATANOL) 0.1 % ophthalmic solution Place 1 drop into both eyes 2 times daily 5 mL 6  "    methylphenidate (METADATE ER) 20 MG CR tablet Take 1 tablet by mouth       triamcinolone (KENALOG) 0.1 % cream Apply sparingly to affected area three times daily for 14 days. 45 g 0     FEXOFENADINE  MG PO TABS 1 TABLET DAILY 90 Tab 3     METHYLIN 10 MG OR TABS Take by mouth as needed Reported on 3/21/2017       neomycin-polymixin-dexamethasone (MAXITROL) ophthalmic suspension Place 1 drop into both eyes 4 times daily (Patient not taking: Reported on 3/9/2018) 1 Bottle 0     Allergies   Allergen Reactions     Fish Swelling     Iodine Swelling     Seasonal Allergies      Hay fever     Recent Labs   Lab Test  10/25/13   1409  08/31/10   0833   LDL   --   142*   HDL   --   39*   TRIG   --   157*   ALT  30   --    TSH   --   2.28      BP Readings from Last 3 Encounters:   03/09/18 126/82   04/20/17 128/84   03/21/17 130/88    Wt Readings from Last 3 Encounters:   03/09/18 226 lb (102.5 kg)   04/20/17 239 lb 3.2 oz (108.5 kg)   03/21/17 239 lb (108.4 kg)         ROS:  Constitutional, HEENT, cardiovascular, pulmonary, GI, , musculoskeletal, neuro, skin, endocrine and psych systems are negative, except as otherwise noted.    This document serves as a record of the services and decisions personally performed and made by Nelida Pardo CNP. It was created on her behalf by Addie Howell, a trained medical scribe. The creation of this document is based the provider's statements to the medical scribe.    Addie Howell March 9, 2018 2:30 PM    OBJECTIVE:   /82  Pulse 70  Temp 99.3  F (37.4  C) (Temporal)  Resp 16  Ht 5' 9.69\" (1.77 m)  Wt 226 lb (102.5 kg)  SpO2 96%  BMI 32.72 kg/m2  Body mass index is 32.72 kg/(m^2).  GENERAL: healthy, alert and no distress  EYES: Eyes grossly normal to inspection and Bilateral dryness and conjunctival irritation and mild swelling in lower lids  HENT: ear canals and TM's normal, nose and mouth without ulcers or lesions  RESP: lungs clear to auscultation - no rales, rhonchi or " wheezes  CV: regular rate and rhythm, normal S1 S2, no S3 or S4, no murmur, click or rub, no peripheral edema and peripheral pulses strong    Diagnostic Test Results:  none     ASSESSMENT/PLAN:       ICD-10-CM    1. Allergic conjunctivitis, bilateral H10.13 olopatadine (PATANOL) 0.1 % ophthalmic solution   Antihistamine (allergy) ophthalmic drops refilled.  See Epic orders for further details. Can also try OTC Zaditor. RTC if not improving or worsening     Encouraged patient to complete routine physical, labs, and cancer screenings as he is due to complete these per HCM.    Follow-up with PCP - Return to clinic with any new or worsening symptoms, and as needed.      The information in this document, created by the medical scribe for me, accurately reflects the services I personally performed and the decisions made by me. I have reviewed and approved this document for accuracy prior to leaving the patient care area.    [facility]

## 2018-03-09 ENCOUNTER — OFFICE VISIT (OUTPATIENT)
Dept: FAMILY MEDICINE | Facility: CLINIC | Age: 48
End: 2018-03-09
Payer: COMMERCIAL

## 2018-03-09 VITALS
SYSTOLIC BLOOD PRESSURE: 126 MMHG | HEART RATE: 70 BPM | RESPIRATION RATE: 16 BRPM | OXYGEN SATURATION: 96 % | BODY MASS INDEX: 32.35 KG/M2 | TEMPERATURE: 99.3 F | WEIGHT: 226 LBS | DIASTOLIC BLOOD PRESSURE: 82 MMHG | HEIGHT: 70 IN

## 2018-03-09 DIAGNOSIS — H10.13 ALLERGIC CONJUNCTIVITIS, BILATERAL: ICD-10-CM

## 2018-03-09 PROCEDURE — 99213 OFFICE O/P EST LOW 20 MIN: CPT | Performed by: NURSE PRACTITIONER

## 2018-03-09 RX ORDER — OLOPATADINE HYDROCHLORIDE 1 MG/ML
1 SOLUTION/ DROPS OPHTHALMIC 2 TIMES DAILY
Qty: 5 ML | Refills: 6 | Status: SHIPPED | OUTPATIENT
Start: 2018-03-09

## 2018-03-09 ASSESSMENT — PAIN SCALES - GENERAL: PAINLEVEL: NO PAIN (0)

## 2018-03-09 NOTE — MR AVS SNAPSHOT
"              After Visit Summary   3/9/2018    Denis Starks    MRN: [id number]           Patient Information     Date Of Birth          1970        Visit Information        Provider Department      3/9/2018 2:20 PM Nelida Pardo APRN CNP [facility]        Today's Diagnoses     Allergic conjunctivitis, bilateral           Follow-ups after your visit        Who to contact     If you have questions or need follow up information about today's clinic visit or your schedule please [facility] at [phone].  Normal or non-critical lab and imaging results will be communicated to you by MyChart, letter or phone within 4 business days after the clinic has received the results. If you do not hear from us within 7 days, please contact the clinic through [employer] or phone. If you have a critical or abnormal lab result, we will notify you by phone as soon as possible.  Submit refill requests through Living Proof or call your pharmacy and they will forward the refill request to us. Please allow 3 business days for your refill to be completed.          Additional Information About Your Visit        MyChart Information     Living Proof lets you send messages to your doctor, view your test results, renew your prescriptions, schedule appointments and more. To sign up, go to [email or web address] . Click on \"Log in\" on the left side of the screen, which will take you to the Welcome page. Then click on \"Sign up Now\" on the right side of the page.     You will be asked to enter the access code listed below, as well as some personal information. Please follow the directions to create your username and password.     Your access code is: [email or web address]  Expires: 6/10/2018  2:06 PM     Your access code will  in 90 days. If you need help or a new code, please call your [facility] or [phone].        Care EveryWhere ID     This is your Care EveryWhere ID. This could be used by other " "organizations to access your [place] medical records  [id number]        Your Vitals Were     Pulse Temperature Respirations Height Pulse Oximetry BMI (Body Mass Index)    70 99.3  F (37.4  C) (Temporal) 16 5' 9.69\" (1.77 m) 96% 32.72 kg/m2       Blood Pressure from Last 3 Encounters:   03/09/18 126/82   04/20/17 128/84   03/21/17 130/88    Weight from Last 3 Encounters:   03/09/18 226 lb (102.5 kg)   04/20/17 239 lb 3.2 oz (108.5 kg)   03/21/17 239 lb (108.4 kg)              Today, you had the following     No orders found for display         Where to get your medicines      These medications were sent to [place] [address] [facility] [place] [place] - [address]  [address] [address] [facility], [place] [address]     Phone:  [phone]     olopatadine 0.1 % ophthalmic solution          Primary Care Provider Office Phone # Fax #    AL Bolaños [facility] [phone] [address] [facility] [address]        Equal Access to Services     [facility]: Hadii aad ku hadasho Soomaali, waaxda luqadaha, qaybta kaalmada adeegyada, tima gonzalez hayrin buckner . So [facility] [phone].    ATENCIÓN: Si habla español, tiene a ramos disposición servicios gratuitos de asistencia lingüística. Llame al [phone].    We comply with applicable federal civil rights laws and [place] laws. We do not discriminate on the basis of race, color, national origin, age, disability, sex, sexual orientation, or gender identity.            Thank you!     Thank you for choosing [facility]  for your care. Our goal is always to provide you with excellent care. Hearing back from our patients is one way we can continue to improve our services. Please take a few minutes to complete the written survey that you may receive in the mail after your visit with us. Thank you!             Your Updated Medication List - Protect others around you: Learn how to safely use, store and throw away your medicines at " www.disposemymeds.org.          This list is accurate as of 3/9/18 11:59 PM.  Always use your most recent med list.                   Brand Name Dispense Instructions for use Diagnosis    fexofenadine 180 MG tablet    ALLEGRA    90 Tab    1 TABLET DAILY    Seasonal allergic rhinitis       * METHYLIN 10 MG tablet   Generic drug:  methylphenidate      Take by mouth as needed Reported on 3/21/2017        * methylphenidate 20 MG CR tablet    METADATE ER     Take 1 tablet by mouth        neomycin-polymixin-dexamethasone ophthalmic suspension    MAXITROL    1 Bottle    Place 1 drop into both eyes 4 times daily    Infestation by Demodex, Squamous blepharitis of upper and lower eyelids of both eyes       olopatadine 0.1 % ophthalmic solution    PATANOL    5 mL    Place 1 drop into both eyes 2 times daily    Allergic conjunctivitis, bilateral       triamcinolone 0.1 % cream    KENALOG    45 g    Apply sparingly to affected area three times daily for 14 days.    Irritant contact dermatitis, unspecified trigger       * Notice:  This list has 2 medication(s) that are the same as other medications prescribed for you. Read the directions carefully, and ask your doctor or other care provider to review them with you.

## 2018-04-30 ENCOUNTER — OFFICE VISIT (OUTPATIENT)
Dept: FAMILY MEDICINE | Facility: CLINIC | Age: 48
End: 2018-04-30
Payer: COMMERCIAL

## 2018-04-30 VITALS
OXYGEN SATURATION: 96 % | WEIGHT: 238 LBS | DIASTOLIC BLOOD PRESSURE: 84 MMHG | HEART RATE: 74 BPM | HEIGHT: 70 IN | RESPIRATION RATE: 16 BRPM | BODY MASS INDEX: 34.07 KG/M2 | SYSTOLIC BLOOD PRESSURE: 128 MMHG | TEMPERATURE: 98.9 F

## 2018-04-30 DIAGNOSIS — L24.9 IRRITANT CONTACT DERMATITIS, UNSPECIFIED TRIGGER: ICD-10-CM

## 2018-04-30 DIAGNOSIS — L20.9 ATOPIC DERMATITIS, UNSPECIFIED TYPE: Primary | ICD-10-CM

## 2018-04-30 LAB — CRP SERPL-MCNC: 4.2 MG/L (ref 0–8)

## 2018-04-30 PROCEDURE — 86431 RHEUMATOID FACTOR QUANT: CPT | Performed by: NURSE PRACTITIONER

## 2018-04-30 PROCEDURE — 86038 ANTINUCLEAR ANTIBODIES: CPT | Performed by: NURSE PRACTITIONER

## 2018-04-30 PROCEDURE — 99213 OFFICE O/P EST LOW 20 MIN: CPT | Performed by: NURSE PRACTITIONER

## 2018-04-30 PROCEDURE — 86140 C-REACTIVE PROTEIN: CPT | Performed by: NURSE PRACTITIONER

## 2018-04-30 PROCEDURE — 36415 COLL VENOUS BLD VENIPUNCTURE: CPT | Performed by: NURSE PRACTITIONER

## 2018-04-30 RX ORDER — TRIAMCINOLONE ACETONIDE 1 MG/G
CREAM TOPICAL
Qty: 45 G | Refills: 1 | Status: SHIPPED | OUTPATIENT
Start: 2018-04-30

## 2018-04-30 RX ORDER — HYDROCORTISONE 25 MG/ML
LOTION TOPICAL
Refills: 0 | COMMUNITY
Start: 2018-01-27

## 2018-04-30 RX ORDER — TRIAMCINOLONE ACETONIDE 1 MG/G
CREAM TOPICAL
Qty: 45 G | Refills: 0 | Status: SHIPPED | OUTPATIENT
Start: 2018-04-30 | End: 2018-04-30

## 2018-04-30 ASSESSMENT — PAIN SCALES - GENERAL: PAINLEVEL: NO PAIN (0)

## 2018-04-30 NOTE — PROGRESS NOTES
SUBJECTIVE:   Denis Starks is a 47 year old male who presents to clinic today for the following health issues:    History of Present Illness     Diet:  Regular (no restrictions)  Frequency of exercise:  None  Taking medications regularly:  Yes  Medication side effects:  None  Additional concerns today:  YES    Rash  Onset: 2 months     Description:   Location: both forearms and both calves   Character: round, blotchy, raised, red  Itching (Pruritis): YES    Progression of Symptoms:  Worsening. Patient was seen for the same issue 1 year ago.     Accompanying Signs & Symptoms:  Fever: no   Body aches or joint pain: no   Sore throat symptoms: no   Recent cold symptoms: no     History:   Previous similar rash: YES, both forearms in the past on  3/ 21/2017     Precipitating factors:   Exposure to similar rash: no   New exposures: None   Recent travel: no     Alleviating factors:  None     Therapies Tried and outcome:  Patient tried  ointment , no relief.   Claritin and Zyrtec as needed.    Problem list and histories reviewed & adjusted, as indicated.  Additional history: as documented  Patient Active Problem List   Diagnosis     Sleep apnea     Seasonal allergic rhinitis     CARDIOVASCULAR SCREENING; LDL GOAL LESS THAN 130     Elevated blood pressure     Infestation by Demodex     Squamous blepharitis of upper and lower eyelids of both eyes     Allergic conjunctivitis, bilateral     Past Surgical History:   Procedure Laterality Date     HC EXCISION OF UVULA  1994      TOOTH EXTRACTION W/FORCEP       SURGICAL HISTORY OF -       rectal     TONSILLECTOMY         Social History   Substance Use Topics     Smoking status: Passive Smoke Exposure - Never Smoker     Smokeless tobacco: Never Used     Alcohol use Yes      Comment: occ     Family History   Problem Relation Age of Onset     DIABETES Mother      ? prediabetes     HEART DISEASE Father      Hypertension No family hx of      C.A.D. No family hx of      CANCER No  "family hx of          Current Outpatient Prescriptions   Medication Sig Dispense Refill     FEXOFENADINE  MG PO TABS 1 TABLET DAILY 90 Tab 3     hydrocortisone 2.5 % lotion APPLY TOPICALLY DAILY FOR 14 DAYS.  0     METHYLIN 10 MG OR TABS Take by mouth as needed Reported on 3/21/2017       methylphenidate (METADATE ER) 20 MG CR tablet Take 1 tablet by mouth       neomycin-polymixin-dexamethasone (MAXITROL) ophthalmic suspension Place 1 drop into both eyes 4 times daily 1 Bottle 0     olopatadine (PATANOL) 0.1 % ophthalmic solution Place 1 drop into both eyes 2 times daily 5 mL 6     triamcinolone (KENALOG) 0.1 % cream Apply sparingly to affected area three times daily for 14 days. 45 g 1     Allergies   Allergen Reactions     Fish Swelling     Iodine Swelling     No Clinical Screening - See Comments      Seasonal Allergies      Hay fever     Recent Labs   Lab Test  10/25/13   1409  08/31/10   0833   LDL   --   142*   HDL   --   39*   TRIG   --   157*   ALT  30   --    TSH   --   2.28      BP Readings from Last 3 Encounters:   04/30/18 128/84   03/09/18 126/82   04/20/17 128/84    Wt Readings from Last 3 Encounters:   04/30/18 238 lb (108 kg)   03/09/18 226 lb (102.5 kg)   04/20/17 239 lb 3.2 oz (108.5 kg)           ROS:  Constitutional, HEENT, cardiovascular, pulmonary, GI, , musculoskeletal, neuro, skin, endocrine and psych systems are negative, except as otherwise noted.    This document serves as a record of the services and decisions personally performed and made by Nelida Pardo CNP. It was created on her behalf by Addie Howell, a trained medical scribe. The creation of this document is based the provider's statements to the medical scribe.    Addie Howell April 30, 2018 2:51 PM    OBJECTIVE:   /84  Pulse 74  Temp 98.9  F (37.2  C) (Temporal)  Resp 16  Ht 5' 9.69\" (1.77 m)  Wt 238 lb (108 kg)  SpO2 96%  BMI 34.45 kg/m2  Body mass index is 34.45 kg/(m^2).  GENERAL: healthy, alert and no " distress  NECK: no adenopathy, no asymmetry, masses, or scars and thyroid normal to palpation  RESP: lungs clear to auscultation - no rales, rhonchi or wheezes  CV: regular rate and rhythm, normal S1 S2, no S3 or S4, no murmur, click or rub, no peripheral edema and peripheral pulses strong  SKIN: On the dorsum on forearms bilaterally has dry and flaky patchiness with some cracking most densely noted just distal to both elbows. On the anterior of both forearms has red folliculitis. Excoriation noted on most effective areas. Also dry and flaky patches behind both calves proximally 5-6cm in diameter.  NEURO: Normal strength and tone, mentation intact and speech normal  PSYCH: mentation appears normal, affect normal/bright    Diagnostic Test Results:  none     ASSESSMENT/PLAN:       ICD-10-CM    1. Atopic dermatitis, unspecified type L20.9 Rheumatoid factor     Anti Nuclear Lisa IgG by IFA with Reflex     CRP, inflammation     triamcinolone (KENALOG) 0.1 % cream     DISCONTINUED: triamcinolone (KENALOG) 0.1 % cream   2. Irritant contact dermatitis, unspecified trigger L24.9 triamcinolone (KENALOG) 0.1 % cream     DISCONTINUED: triamcinolone (KENALOG) 0.1 % cream   Reviewed rash concerns patient presents today. Rash appearance seems associated with contact dermatitis. Will refill Triamcinolone cream as that has been effective in the past and discussed applying daily to the affected areas TID. RTC to clinic if worsening or not improving.    Labs ordered for patient to complete today to rule out possibility of psoriatic arthritis. Will notify with results.    Follow-up - Return to clinic with any new or worsening symptoms, and as needed.      The information in this document, created by the medical scribe for me, accurately reflects the services I personally performed and the decisions made by me. I have reviewed and approved this document for accuracy prior to leaving the patient care area.    AL Stafford  [facility] [place]

## 2018-04-30 NOTE — MR AVS SNAPSHOT
"              After Visit Summary   4/30/2018    Denis Starks    MRN: [id number]           Patient Information     Date Of Birth          1970        Visit Information        Provider Department      4/30/2018 2:40 PM Nelida Pardo APRN CNP [facility] [place]        Today's Diagnoses     Atopic dermatitis, unspecified type    -  1    Irritant contact dermatitis, unspecified trigger          Care Instructions    For the rash, we'll restart you on Triamcinolone cream. You can apply this up to three times per day. The night time application is best as it can be applied and won't be bothered.          Follow-ups after your visit        Who to contact     If you have questions or need follow up information about today's clinic visit or your schedule please contact [facility] directly at [phone].  Normal or non-critical lab and imaging results will be communicated to you by MyChart, letter or phone within 4 business days after the clinic has received the results. If you do not hear from us within 7 days, please contact the clinic through MyChart or phone. If you have a critical or abnormal lab result, we will notify you by phone as soon as possible.  Submit refill requests through DocDoc or call your pharmacy and they will forward the refill request to us. Please allow 3 business days for your refill to be completed.          Additional Information About Your Visit        MyChart Information     DocDoc lets you send messages to your doctor, view your test results, renew your prescriptions, schedule appointments and more. To sign up, go to [email or web address] . Click on \"Log in\" on the left side of the screen, which will take you to the Welcome page. Then click on \"Sign up Now\" on the right side of the page.     You will be asked to enter the access code listed below, as well as some personal information. Please follow the directions to create your username and password.     Your " "access code is: [email or web address]  Expires: 6/10/2018  2:06 PM     Your access code will  in 90 days. If you need help or a new code, please call your [place] clinic or [phone].        Care EveryWhere ID     This is your Care EveryWhere ID. This could be used by other organizations to access your [place] medical records  [id number]        Your Vitals Were     Pulse Temperature Respirations Height Pulse Oximetry BMI (Body Mass Index)    74 98.9  F (37.2  C) (Temporal) 16 5' 9.69\" (1.77 m) 96% 34.45 kg/m2       Blood Pressure from Last 3 Encounters:   18 128/84   18 126/82   17 128/84    Weight from Last 3 Encounters:   18 238 lb (108 kg)   18 226 lb (102.5 kg)   17 239 lb 3.2 oz (108.5 kg)              We Performed the Following     Anti Nuclear Lisa IgG by IFA with Reflex     CRP, inflammation     Rheumatoid factor          Where to get your medicines      These medications were sent to [place] [address] [facility] - [place], [place] - [address]  [address] [address] [facility] [address]     Phone:  [phone]     triamcinolone 0.1 % cream          Primary Care Provider Office Phone # Fax #    Nelidajaneth Pardo, [place] [facility] [phone] [address] [facility] [address]        Equal Access to Services     [facility] : Hadii aad ku hadasho Soomaali, waaxda luqadaha, qaybta kaalmada adeegyada, waxlan carlos buckner . So [facility] [phone].    ATENCIÓN: Si habla español, tiene a ramos disposición servicios gratuitos de asistencia lingüística. Llame al [phone].    We comply with applicable federal civil rights laws and [place] laws. We do not discriminate on the basis of race, color, national origin, age, disability, sex, sexual orientation, or gender identity.            Thank you!     Thank you for choosing [facility]  for your care. Our goal is always to provide you with excellent care. Hearing back from our patients is " one way we can continue to improve our services. Please take a few minutes to complete the written survey that you may receive in the mail after your visit with us. Thank you!             Your Updated Medication List - Protect others around you: Learn how to safely use, store and throw away your medicines at www.disposemymeds.org.          This list is accurate as of 4/30/18 11:59 PM.  Always use your most recent med list.                   Brand Name Dispense Instructions for use Diagnosis    fexofenadine 180 MG tablet    ALLEGRA    90 Tab    1 TABLET DAILY    Seasonal allergic rhinitis       hydrocortisone 2.5 % lotion      APPLY TOPICALLY DAILY FOR 14 DAYS.        * METHYLIN 10 MG tablet   Generic drug:  methylphenidate      Take by mouth as needed Reported on 3/21/2017        * methylphenidate 20 MG CR tablet    METADATE ER     Take 1 tablet by mouth        neomycin-polymixin-dexamethasone ophthalmic suspension    MAXITROL    1 Bottle    Place 1 drop into both eyes 4 times daily    Infestation by Demodex, Squamous blepharitis of upper and lower eyelids of both eyes       olopatadine 0.1 % ophthalmic solution    PATANOL    5 mL    Place 1 drop into both eyes 2 times daily    Allergic conjunctivitis, bilateral       triamcinolone 0.1 % cream    KENALOG    45 g    Apply sparingly to affected area three times daily for 14 days.    Irritant contact dermatitis, unspecified trigger, Atopic dermatitis, unspecified type       * Notice:  This list has 2 medication(s) that are the same as other medications prescribed for you. Read the directions carefully, and ask your doctor or other care provider to review them with you.

## 2018-04-30 NOTE — PATIENT INSTRUCTIONS
For the rash, we'll restart you on Triamcinolone cream. You can apply this up to three times per day. The night time application is best as it can be applied and won't be bothered.

## 2018-05-01 LAB
ANA SER QL IF: NEGATIVE
RHEUMATOID FACT SER NEPH-ACNC: <20 IU/ML (ref 0–20)

## 2018-08-20 ENCOUNTER — TELEPHONE (OUTPATIENT)
Dept: FAMILY MEDICINE | Facility: CLINIC | Age: 48
End: 2018-08-20

## 2018-08-20 NOTE — TELEPHONE ENCOUNTER
Reason for Call:  Form, our goal is to have forms completed with 72 hours, however, some forms may require a visit or additional information.    Type of letter, form or note:  medical    Who is the form from?: Patient    Where did the form come from: Patient or family brought in       What clinic location was the form placed at?: [facility] - [phone]    Where the form was placed: [facility]    What number is listed as a contact on the form?: [phone] (theodores number)       Additional comments: pt is donating plasma and wondering if Nelida Pardo will fill out form or if it needs to go through sleep doctor. Pt figures it would be done quicker through Nelida Pardo. Please contact pt once completed or with questions    Call taken on 8/20/2018 at 3:02 PM by Susi Clark

## 2018-10-09 ENCOUNTER — TRANSFERRED RECORDS (OUTPATIENT)
Dept: HEALTH INFORMATION MANAGEMENT | Facility: CLINIC | Age: 48
End: 2018-10-09